# Patient Record
Sex: FEMALE | Race: OTHER | HISPANIC OR LATINO | URBAN - METROPOLITAN AREA
[De-identification: names, ages, dates, MRNs, and addresses within clinical notes are randomized per-mention and may not be internally consistent; named-entity substitution may affect disease eponyms.]

---

## 2017-06-30 ENCOUNTER — EMERGENCY (EMERGENCY)
Facility: HOSPITAL | Age: 32
LOS: 1 days | Discharge: HOME | End: 2017-06-30
Attending: EMERGENCY MEDICINE
Payer: COMMERCIAL

## 2017-06-30 VITALS
RESPIRATION RATE: 18 BRPM | WEIGHT: 119.93 LBS | TEMPERATURE: 98 F | DIASTOLIC BLOOD PRESSURE: 73 MMHG | OXYGEN SATURATION: 98 % | HEIGHT: 63 IN | HEART RATE: 85 BPM | SYSTOLIC BLOOD PRESSURE: 119 MMHG

## 2017-06-30 DIAGNOSIS — N12 TUBULO-INTERSTITIAL NEPHRITIS, NOT SPECIFIED AS ACUTE OR CHRONIC: ICD-10-CM

## 2017-06-30 DIAGNOSIS — N83.202 UNSPECIFIED OVARIAN CYST, LEFT SIDE: ICD-10-CM

## 2017-06-30 LAB
ANION GAP SERPL CALC-SCNC: 9 MMOL/L — SIGNIFICANT CHANGE UP (ref 5–17)
BASOPHILS # BLD AUTO: 0.1 K/UL — SIGNIFICANT CHANGE UP (ref 0–0.2)
BASOPHILS NFR BLD AUTO: 0.5 % — SIGNIFICANT CHANGE UP (ref 0–2)
BUN SERPL-MCNC: 13 MG/DL — SIGNIFICANT CHANGE UP (ref 7–18)
CALCIUM SERPL-MCNC: 8.4 MG/DL — SIGNIFICANT CHANGE UP (ref 8.4–10.5)
CHLORIDE SERPL-SCNC: 106 MMOL/L — SIGNIFICANT CHANGE UP (ref 96–108)
CO2 SERPL-SCNC: 20 MMOL/L — LOW (ref 22–31)
CREAT SERPL-MCNC: 1.15 MG/DL — SIGNIFICANT CHANGE UP (ref 0.5–1.3)
EOSINOPHIL # BLD AUTO: 0 K/UL — SIGNIFICANT CHANGE UP (ref 0–0.5)
EOSINOPHIL NFR BLD AUTO: 0 % — SIGNIFICANT CHANGE UP (ref 0–6)
GLUCOSE SERPL-MCNC: 114 MG/DL — HIGH (ref 70–99)
HCG SERPL-ACNC: <1 MIU/ML — SIGNIFICANT CHANGE UP
HCT VFR BLD CALC: 36.2 % — SIGNIFICANT CHANGE UP (ref 34.5–45)
HGB BLD-MCNC: 11.6 G/DL — SIGNIFICANT CHANGE UP (ref 11.5–15.5)
LYMPHOCYTES # BLD AUTO: 0.6 K/UL — LOW (ref 1–3.3)
LYMPHOCYTES # BLD AUTO: 4.6 % — LOW (ref 13–44)
MCHC RBC-ENTMCNC: 26.8 PG — LOW (ref 27–34)
MCHC RBC-ENTMCNC: 32.1 GM/DL — SIGNIFICANT CHANGE UP (ref 32–36)
MCV RBC AUTO: 83.5 FL — SIGNIFICANT CHANGE UP (ref 80–100)
MONOCYTES # BLD AUTO: 0.9 K/UL — SIGNIFICANT CHANGE UP (ref 0–0.9)
MONOCYTES NFR BLD AUTO: 7.2 % — SIGNIFICANT CHANGE UP (ref 2–14)
NEUTROPHILS # BLD AUTO: 11.1 K/UL — HIGH (ref 1.8–7.4)
NEUTROPHILS NFR BLD AUTO: 87.6 % — HIGH (ref 43–77)
PLATELET # BLD AUTO: 188 K/UL — SIGNIFICANT CHANGE UP (ref 150–400)
POTASSIUM SERPL-MCNC: 3.6 MMOL/L — SIGNIFICANT CHANGE UP (ref 3.5–5.3)
POTASSIUM SERPL-SCNC: 3.6 MMOL/L — SIGNIFICANT CHANGE UP (ref 3.5–5.3)
RBC # BLD: 4.34 M/UL — SIGNIFICANT CHANGE UP (ref 3.8–5.2)
RBC # FLD: 11.8 % — SIGNIFICANT CHANGE UP (ref 10.3–14.5)
SODIUM SERPL-SCNC: 135 MMOL/L — SIGNIFICANT CHANGE UP (ref 135–145)
WBC # BLD: 12.7 K/UL — HIGH (ref 3.8–10.5)
WBC # FLD AUTO: 12.7 K/UL — HIGH (ref 3.8–10.5)

## 2017-06-30 PROCEDURE — 99285 EMERGENCY DEPT VISIT HI MDM: CPT

## 2017-06-30 RX ORDER — ACETAMINOPHEN 500 MG
975 TABLET ORAL ONCE
Qty: 0 | Refills: 0 | Status: COMPLETED | OUTPATIENT
Start: 2017-06-30 | End: 2017-06-30

## 2017-06-30 RX ORDER — SODIUM CHLORIDE 9 MG/ML
2000 INJECTION INTRAMUSCULAR; INTRAVENOUS; SUBCUTANEOUS ONCE
Qty: 0 | Refills: 0 | Status: COMPLETED | OUTPATIENT
Start: 2017-06-30 | End: 2017-06-30

## 2017-06-30 RX ADMIN — SODIUM CHLORIDE 4000 MILLILITER(S): 9 INJECTION INTRAMUSCULAR; INTRAVENOUS; SUBCUTANEOUS at 22:38

## 2017-06-30 RX ADMIN — Medication 975 MILLIGRAM(S): at 22:35

## 2017-06-30 NOTE — ED PROVIDER NOTE - PROGRESS NOTE DETAILS
Complete resolution of all symptoms after medication. Repeat abdominal exam soft, nontender, with no rebound or surgical signs. Patient is tolerating oral fluid and solid challenge without difficulty, nausea, pain or vomiting. Patient to be discharged home with close outpatient followup. Discharge plan discussed with patient at length and patient voices understanding.

## 2017-06-30 NOTE — ED PROVIDER NOTE - OBJECTIVE STATEMENT
31 y/o F pt with PMHx of L ovarian cyst, presents to ED c/o intermittent LLQ abd pain x 1 month but worsened today. Pt endorses to HA and subjective fever. Pt denies nausea, vomiting, diarrhea, SOB, cough, CP, palpitations, dysuria, urinary frequency, hematuria, or any other complaints. NKDA.

## 2017-06-30 NOTE — ED ADULT NURSE NOTE - OBJECTIVE STATEMENT
pt. is aox3 came to er c/o fever/headache and abdominal pain x 2 days. Denies any vomiting. Pain is to lower left quadrant

## 2017-06-30 NOTE — ED PROVIDER NOTE - CHPI ED SYMPTOMS NEG
no nausea/no vomiting/no diarrhea/no SOB, no cough, no CP, no palpitations, no dysuria, no urinary frequency, no hematuria

## 2017-06-30 NOTE — ED PROVIDER NOTE - CARE PLAN
Principal Discharge DX:	Abdominal pain  Secondary Diagnosis:	Cyst of left ovary  Secondary Diagnosis:	Pyelonephritis, acute

## 2017-06-30 NOTE — ED PROVIDER NOTE - MEDICAL DECISION MAKING DETAILS
Patient improved, tolerating po, abd, soft, nt and she is well appearing, evaluated by OB/GYN and recommend discharge and outpatient follow up. Precautions reviewed.

## 2017-07-01 VITALS
HEART RATE: 80 BPM | SYSTOLIC BLOOD PRESSURE: 107 MMHG | DIASTOLIC BLOOD PRESSURE: 70 MMHG | TEMPERATURE: 98 F | RESPIRATION RATE: 18 BRPM | OXYGEN SATURATION: 97 %

## 2017-07-01 DIAGNOSIS — N83.202 UNSPECIFIED OVARIAN CYST, LEFT SIDE: ICD-10-CM

## 2017-07-01 LAB
APPEARANCE UR: CLEAR — SIGNIFICANT CHANGE UP
BILIRUB UR-MCNC: NEGATIVE — SIGNIFICANT CHANGE UP
COLOR SPEC: YELLOW — SIGNIFICANT CHANGE UP
DIFF PNL FLD: ABNORMAL
GLUCOSE UR QL: NEGATIVE — SIGNIFICANT CHANGE UP
HCG UR QL: NEGATIVE — SIGNIFICANT CHANGE UP
KETONES UR-MCNC: ABNORMAL
LEUKOCYTE ESTERASE UR-ACNC: ABNORMAL
NITRITE UR-MCNC: NEGATIVE — SIGNIFICANT CHANGE UP
PH UR: 6 — SIGNIFICANT CHANGE UP (ref 5–8)
PROT UR-MCNC: 30 MG/DL
SP GR SPEC: 1 — LOW (ref 1.01–1.02)
UROBILINOGEN FLD QL: NEGATIVE — SIGNIFICANT CHANGE UP

## 2017-07-01 PROCEDURE — 76856 US EXAM PELVIC COMPLETE: CPT | Mod: 26

## 2017-07-01 PROCEDURE — 76830 TRANSVAGINAL US NON-OB: CPT

## 2017-07-01 PROCEDURE — 96375 TX/PRO/DX INJ NEW DRUG ADDON: CPT

## 2017-07-01 PROCEDURE — 81025 URINE PREGNANCY TEST: CPT

## 2017-07-01 PROCEDURE — 84702 CHORIONIC GONADOTROPIN TEST: CPT

## 2017-07-01 PROCEDURE — 96374 THER/PROPH/DIAG INJ IV PUSH: CPT

## 2017-07-01 PROCEDURE — 87186 SC STD MICRODIL/AGAR DIL: CPT

## 2017-07-01 PROCEDURE — 87086 URINE CULTURE/COLONY COUNT: CPT

## 2017-07-01 PROCEDURE — 76830 TRANSVAGINAL US NON-OB: CPT | Mod: 26

## 2017-07-01 PROCEDURE — 76856 US EXAM PELVIC COMPLETE: CPT

## 2017-07-01 PROCEDURE — 99284 EMERGENCY DEPT VISIT MOD MDM: CPT | Mod: 25

## 2017-07-01 PROCEDURE — 85027 COMPLETE CBC AUTOMATED: CPT

## 2017-07-01 PROCEDURE — 81001 URINALYSIS AUTO W/SCOPE: CPT

## 2017-07-01 PROCEDURE — 80048 BASIC METABOLIC PNL TOTAL CA: CPT

## 2017-07-01 RX ORDER — IBUPROFEN 200 MG
1 TABLET ORAL
Qty: 30 | Refills: 0 | OUTPATIENT
Start: 2017-07-01 | End: 2017-07-11

## 2017-07-01 RX ORDER — CEFTRIAXONE 500 MG/1
1 INJECTION, POWDER, FOR SOLUTION INTRAMUSCULAR; INTRAVENOUS ONCE
Qty: 0 | Refills: 0 | Status: COMPLETED | OUTPATIENT
Start: 2017-07-01 | End: 2017-07-01

## 2017-07-01 RX ORDER — CEPHALEXIN 500 MG
1 CAPSULE ORAL
Qty: 40 | Refills: 0 | OUTPATIENT
Start: 2017-07-01 | End: 2017-07-11

## 2017-07-01 RX ORDER — KETOROLAC TROMETHAMINE 30 MG/ML
30 SYRINGE (ML) INJECTION ONCE
Qty: 0 | Refills: 0 | Status: DISCONTINUED | OUTPATIENT
Start: 2017-07-01 | End: 2017-07-01

## 2017-07-01 RX ADMIN — CEFTRIAXONE 100 GRAM(S): 500 INJECTION, POWDER, FOR SOLUTION INTRAMUSCULAR; INTRAVENOUS at 02:15

## 2017-07-01 RX ADMIN — Medication 30 MILLIGRAM(S): at 01:59

## 2017-07-01 RX ADMIN — Medication 30 MILLIGRAM(S): at 01:29

## 2017-07-01 NOTE — CONSULT NOTE ADULT - SUBJECTIVE AND OBJECTIVE BOX
32 year old g lmp       OB/GYN HISTORY:   [place]:  Parity:  Term:  :  MAB/TAB/Ectopic:  Living:    LMP  Name of GYN Physician:  Date of Last Pap:    PAST MEDICAL & SURGICAL HISTORY:  Ovarian cyst, left  No significant past surgical history    MEDICATIONS:    Allergies:    No Known Allergies      SOCIAL HISTORY:      Vital Signs Last 24 Hrs  T(C): 37.4 (2017 23:49), Max: 37.4 (2017 23:49)  T(F): 99.4 (2017 23:49), Max: 99.4 (2017 23:49)  HR: 95 (2017 23:49) (85 - 95)  BP: 104/57 (2017 23:49) (104/57 - 119/73)  BP(mean): --  RR: 18 (2017 23:49) (18 - 18)  SpO2: 98% (2017 23:49) (98% - 98%)    PHYSICAL EXAM:    Gen:  Abd:  gyn:  ext:     LABS:                        11.6   12.7  )-----------( 188      ( 2017 22:44 )             36.2     06-30    135  |  106  |  13  ----------------------------<  114<H>  3.6   |  20<L>  |  1.15    Ca    8.4      2017 22:44        Urinalysis Basic - ( 2017 00:06 )    Color: Yellow / Appearance: Clear / S.005 / pH: x  Gluc: x / Ketone: Small  / Bili: Negative / Urobili: Negative   Blood: x / Protein: 30 mg/dL / Nitrite: Negative   Leuk Esterase: Moderate / RBC: 5-10 /HPF / WBC >50 /HPF   Sq Epi: x / Non Sq Epi: Few / Bacteria: x        RADIOLOGY & ADDITIONAL STUDIES: 32 year old  lmp 17 presents to the ER complaining of left lower abdominal pain. Patient states pain began 10 am on 17 and has progressively gotten worse. Patient describes the pain as "sharp", radiating to the left pelvic region, and rates it 7/10, 10 being the worst. Patient states they took 2 motrin tablets yesterday with moderate relief. Patient admits to fever, chills, and loss of appetite Patient denies headache, nausea, vomiting, diarrhea, SOB, chest pain, dysuria.   Patient has had bowel and bladder movement in the past 5 hours.   Patient states they feel much relief of symptoms after taking percocet in ED.     OB/GYN HISTORY:   [place]:1  Parity:1  Term:1  :0  MAB/TAB/Ectopic:0  [place]    LMP: 17  Name of GYN Physician: N/A  Date of Last Pap: N/A    PAST MEDICAL & SURGICAL HISTORY:  Ovarian cyst, left      MEDICATIONS: none      Allergies:    No Known Allergies      SOCIAL HISTORY: denies x 3      Vital Signs Last 24 Hrs  T(C): 37.4 (2017 23:49), Max: 37.4 (2017 23:49)  T(F): 99.4 (2017 23:49), Max: 99.4 (2017 23:49)  HR: 95 (2017 23:49) (85 - 95)  BP: 104/57 (2017 23:49) (104/57 - 119/73)  BP(mean): --  RR: 18 (2017 23:49) (18 - 18)  SpO2: 98% (2017 23:49) (98% - 98%)    PHYSICAL EXAM:    Gen: Patient is awake, alert in NAD  Abd: LLQ tender to deep palpation. All other quadrants non tender to palpation. No shifting dullness, Negative Psoas, Negative Rovsing, Negative Carson sign.   gyn: no bleeding, no pooling, no abnormal masses palpated, no CMT, no AT  ext: no edema     LABS:                        11.6   12.7  )-----------( 188      ( 2017 22:44 )             36.2     06-30    135  |  106  |  13  ----------------------------<  114<H>  3.6   |  20<L>  |  1.15    Ca    8.4      2017 22:44        Urinalysis Basic - ( 2017 00:06 )    Color: Yellow / Appearance: Clear / S.005 / pH: x  Gluc: x / Ketone: Small  / Bili: Negative / Urobili: Negative   Blood: x / Protein: 30 mg/dL / Nitrite: Negative   Leuk Esterase: Moderate / RBC: 5-10 /HPF / WBC >50 /HPF   Sq Epi: x / Non Sq Epi: Few / Bacteria: x        RADIOLOGY & ADDITIONAL STUDIES:    < from: US Transvaginal (17 @ 02:49) >  Transvaginal:  The endometrium is not thickened, measuring 1 mm. There are no myometrial   lesions. The cervix is unremarkable.    There is a trace amount of free pelvic fluid.    The right ovary measures 6.2 x 3.3 x 4.5 cm. There is a 3.9 x 5.3 x 3.1   cm simple cyst. Normal flow is detected in the right ovary.    The left ovary measures 3.1 x 1.5 x 2.5 cm, normal. Normal flow is   detected in the left ovary.    IMPRESSION:    There is a 5.3 cm right ovarian simple cyst.    < end of copied text >

## 2017-07-01 NOTE — CONSULT NOTE ADULT - PROBLEM SELECTOR RECOMMENDATION 9
-no acute GYN surgical interventions at this time   -Pain control as needed   -Patient advised to followup outpatient with Dr. Olanesceu, informations given   -Patient advised to rest from extensive physical activities, return if any acute concerns, consult gyn as needed   -case d/w Dr. Olanescu

## 2017-07-01 NOTE — CONSULT NOTE ADULT - ASSESSMENT
A/P: 32 year old  with Ovarian cyst (5.3 cm) with flow, no evidence of torsion   -no acute GYN surgical interventions at this time   -Pain control as needed   -Patient advised to followup outpatient with Dr. Olanesceu, informations given   -Patient advised to rest from extensive physical activities, return if any acute concerns, consult gyn as needed   -case d/w Dr. Olanescu

## 2017-07-04 LAB
-  AMIKACIN: SIGNIFICANT CHANGE UP
-  AMPICILLIN/SULBACTAM: SIGNIFICANT CHANGE UP
-  AMPICILLIN: SIGNIFICANT CHANGE UP
-  AZTREONAM: SIGNIFICANT CHANGE UP
-  CEFAZOLIN: SIGNIFICANT CHANGE UP
-  CEFEPIME: SIGNIFICANT CHANGE UP
-  CEFOXITIN: SIGNIFICANT CHANGE UP
-  CEFTAZIDIME: SIGNIFICANT CHANGE UP
-  CEFTRIAXONE: SIGNIFICANT CHANGE UP
-  CIPROFLOXACIN: SIGNIFICANT CHANGE UP
-  ERTAPENEM: SIGNIFICANT CHANGE UP
-  GENTAMICIN: SIGNIFICANT CHANGE UP
-  LEVOFLOXACIN: SIGNIFICANT CHANGE UP
-  MEROPENEM: SIGNIFICANT CHANGE UP
-  NITROFURANTOIN: SIGNIFICANT CHANGE UP
-  PIPERACILLIN/TAZOBACTAM: SIGNIFICANT CHANGE UP
-  TOBRAMYCIN: SIGNIFICANT CHANGE UP
-  TRIMETHOPRIM/SULFAMETHOXAZOLE: SIGNIFICANT CHANGE UP
CULTURE RESULTS: SIGNIFICANT CHANGE UP
METHOD TYPE: SIGNIFICANT CHANGE UP
ORGANISM # SPEC MICROSCOPIC CNT: SIGNIFICANT CHANGE UP
ORGANISM # SPEC MICROSCOPIC CNT: SIGNIFICANT CHANGE UP
SPECIMEN SOURCE: SIGNIFICANT CHANGE UP

## 2018-01-23 NOTE — ED PROVIDER NOTE - SKIN, MLM
Service to family practice placed today 01/23/18.   Skin normal color for race, warm, dry and intact. No evidence of rash.

## 2024-09-15 NOTE — ED PROVIDER NOTE - SECONDARY DIAGNOSIS.
[facility]  323/01  Consultation Note   Patient: Jaelyn Lo  Today's Date: 9/15/2024    YOB: 1965  Admission Date: 9/15/2024    MRN: [id number]  Date of Service: 9/15/2024   Requesting Provider: Heydi Lo [id number]  Signed on Workstation ID: [id number]   Electronically Signed by: Karthikeyan Whiteside MD  Signed on: 9/15/2024 9:09 AM  Created on Workstation ID: [id number]  Signed on Workstation ID: [id number]           Maximo Low MD   Pyelonephritis, acute Cyst of left ovary